# Patient Record
Sex: MALE | Race: WHITE | HISPANIC OR LATINO | Employment: OTHER | URBAN - METROPOLITAN AREA
[De-identification: names, ages, dates, MRNs, and addresses within clinical notes are randomized per-mention and may not be internally consistent; named-entity substitution may affect disease eponyms.]

---

## 2020-04-17 ENCOUNTER — HOSPITAL ENCOUNTER (EMERGENCY)
Facility: HOSPITAL | Age: 40
Discharge: HOME | End: 2020-04-17
Attending: EMERGENCY MEDICINE

## 2020-04-17 VITALS
OXYGEN SATURATION: 96 % | SYSTOLIC BLOOD PRESSURE: 111 MMHG | HEART RATE: 93 BPM | TEMPERATURE: 98 F | DIASTOLIC BLOOD PRESSURE: 68 MMHG | RESPIRATION RATE: 18 BRPM

## 2020-04-17 DIAGNOSIS — S61.012A LACERATION OF LEFT THUMB WITHOUT FOREIGN BODY WITHOUT DAMAGE TO NAIL, INITIAL ENCOUNTER: Primary | ICD-10-CM

## 2020-04-17 PROCEDURE — 63600175 PHARM REV CODE 636 W HCPCS: Performed by: PHYSICIAN ASSISTANT

## 2020-04-17 PROCEDURE — 90715 TDAP VACCINE 7 YRS/> IM: CPT | Performed by: PHYSICIAN ASSISTANT

## 2020-04-17 PROCEDURE — 12001 RPR S/N/AX/GEN/TRNK 2.5CM/<: CPT

## 2020-04-17 PROCEDURE — 99282 EMERGENCY DEPT VISIT SF MDM: CPT | Mod: 25

## 2020-04-17 PROCEDURE — 90471 IMMUNIZATION ADMIN: CPT | Performed by: PHYSICIAN ASSISTANT

## 2020-04-17 RX ORDER — LIDOCAINE HYDROCHLORIDE 10 MG/ML
10 INJECTION INFILTRATION; PERINEURAL
Status: DISCONTINUED | OUTPATIENT
Start: 2020-04-17 | End: 2020-04-17 | Stop reason: HOSPADM

## 2020-04-17 RX ADMIN — CLOSTRIDIUM TETANI TOXOID ANTIGEN (FORMALDEHYDE INACTIVATED), CORYNEBACTERIUM DIPHTHERIAE TOXOID ANTIGEN (FORMALDEHYDE INACTIVATED), BORDETELLA PERTUSSIS TOXOID ANTIGEN (GLUTARALDEHYDE INACTIVATED), BORDETELLA PERTUSSIS FILAMENTOUS HEMAGGLUTININ ANTIGEN (FORMALDEHYDE INACTIVATED), BORDETELLA PERTUSSIS PERTACTIN ANTIGEN, AND BORDETELLA PERTUSSIS FIMBRIAE 2/3 ANTIGEN 0.5 ML: 5; 2; 2.5; 5; 3; 5 INJECTION, SUSPENSION INTRAMUSCULAR at 02:04

## 2020-04-17 NOTE — ED PROVIDER NOTES
Encounter Date: 4/17/2020    SCRIBE #1 NOTE: I, Allison Sotelo , am scribing for, and in the presence of,  Allison Huntley PA-C. I have scribed the entire note.       History     Chief Complaint   Patient presents with    Laceration     pt states that he cut himself with a tape measure, lac sustained to left thumb with moderate bleeding noted; unsure of last tetanus     Yas Meza is a 40 y.o. male who  has no past medical history on file.    The patient presents to the ED due to a finger laceration. Pt reports he cut left thumb while using a tape measure at home. He denies any numbness, tingling or any other injuries at this time. Pt's Tetanus is not up to date.     The history is provided by the patient.     Review of patient's allergies indicates:  No Known Allergies  No past medical history on file.  No past surgical history on file.  No family history on file.  Social History     Tobacco Use    Smoking status: Not on file   Substance Use Topics    Alcohol use: Not on file    Drug use: Not on file     Review of Systems   Constitutional: Negative for fever.   Skin: Positive for wound.   Neurological: Negative for syncope and numbness.       Physical Exam     Initial Vitals [04/17/20 1431]   BP Pulse Resp Temp SpO2   111/68 93 18 98.1 °F (36.7 °C) 96 %      MAP       --         Physical Exam    Nursing note and vitals reviewed.  Constitutional: He appears well-developed and well-nourished.   HENT:   Head: Normocephalic and atraumatic.   Eyes: Conjunctivae, EOM and lids are normal. Pupils are equal, round, and reactive to light.   Neck: Trachea normal, normal range of motion and full passive range of motion without pain.   Abdominal: Normal appearance.   Musculoskeletal: Normal range of motion.   Neurological: He is alert.   Skin: Skin is warm, dry and intact.   Laceration just distal to the nail bed on the medial aspect of the left thumb   Does not involve nail bed  Neurovasculary intact    Psychiatric: He  has a normal mood and affect. His speech is normal and behavior is normal. Judgment and thought content normal.         ED Course   Lac Repair  Date/Time: 4/17/2020 2:55 PM  Performed by: Allison Huntley PA-C  Authorized by: Kenny Casanova MD   Body area: upper extremity  Location details: left thumb  Foreign bodies: no foreign bodies  Tendon involvement: none  Nerve involvement: none  Vascular damage: no  Preparation: Patient was prepped and draped in the usual sterile fashion.  Irrigation solution: saline  Irrigation method: syringe  Amount of cleaning: standard  Debridement: none  Degree of undermining: none  Skin closure: glue  Patient tolerance: Patient tolerated the procedure well with no immediate complications        Labs Reviewed - No data to display       Imaging Results    None          Medical Decision Making:   Initial Assessment:   Laceration to left thumb  Differential Diagnosis:   Laceration simple versus complex  ED Management:  Patient presents to the ER for evaluation of laceration sustained by tape measure prior to arrival.  Bleeding is controlled.  Tetanus was updated.  Glue was applied and patient was given information on wound care.  Instructed to return with any signs concerning for infection otherwise to just monitor at home.  Patient verbalized understanding and agreement with plan                                 Clinical Impression:       ICD-10-CM ICD-9-CM   1. Laceration of left thumb without foreign body without damage to nail, initial encounter S61.012A 883.0       Scribe Attestation I, Allison Huntley PA-C, personally performed the services described in this documentation. All medical record entries made by the scribe were at my direction and in my presence.  I have reviewed the chart and agree that the record reflects my personal performance and is accurate and complete.                           Allison Huntley PA-C  04/17/20 2915

## 2023-05-27 ENCOUNTER — HOSPITAL ENCOUNTER (OUTPATIENT)
Facility: HOSPITAL | Age: 43
Discharge: HOME | End: 2023-05-28
Attending: EMERGENCY MEDICINE | Admitting: STUDENT IN AN ORGANIZED HEALTH CARE EDUCATION/TRAINING PROGRAM

## 2023-05-27 DIAGNOSIS — R55 SYNCOPE: ICD-10-CM

## 2023-05-27 DIAGNOSIS — R56.9 SEIZURE-LIKE ACTIVITY: Primary | ICD-10-CM

## 2023-05-27 DIAGNOSIS — R07.9 CHEST PAIN: ICD-10-CM

## 2023-05-27 DIAGNOSIS — R56.9 SEIZURE: ICD-10-CM

## 2023-05-27 DIAGNOSIS — R79.89 LFT ELEVATION: ICD-10-CM

## 2023-05-27 DIAGNOSIS — R56.9 WITNESSED SEIZURE-LIKE ACTIVITY: ICD-10-CM

## 2023-05-27 PROBLEM — E78.5 HLD (HYPERLIPIDEMIA): Status: ACTIVE | Noted: 2023-05-27

## 2023-05-27 PROBLEM — R74.8 ELEVATED LIVER ENZYMES: Status: ACTIVE | Noted: 2023-05-27

## 2023-05-27 PROBLEM — K21.9 GERD (GASTROESOPHAGEAL REFLUX DISEASE): Status: ACTIVE | Noted: 2023-05-27

## 2023-05-27 LAB
ALBUMIN SERPL BCP-MCNC: 3.6 G/DL (ref 3.5–5.2)
ALBUMIN SERPL BCP-MCNC: 4 G/DL (ref 3.5–5.2)
ALP SERPL-CCNC: 119 U/L (ref 55–135)
ALP SERPL-CCNC: 138 U/L (ref 55–135)
ALT SERPL W/O P-5'-P-CCNC: 111 U/L (ref 10–44)
ALT SERPL W/O P-5'-P-CCNC: 124 U/L (ref 10–44)
AMPHET+METHAMPHET UR QL: NEGATIVE
ANION GAP SERPL CALC-SCNC: 9 MMOL/L (ref 8–16)
ANION GAP SERPL CALC-SCNC: 9 MMOL/L (ref 8–16)
ASCENDING AORTA: 2.72 CM
AST SERPL-CCNC: 39 U/L (ref 10–40)
AST SERPL-CCNC: 44 U/L (ref 10–40)
AV INDEX (PROSTH): 0.65
AV MEAN GRADIENT: 3 MMHG
AV PEAK GRADIENT: 6 MMHG
AV VALVE AREA: 2.26 CM2
AV VELOCITY RATIO: 0.63
BARBITURATES UR QL SCN>200 NG/ML: NEGATIVE
BASOPHILS # BLD AUTO: 0.04 K/UL (ref 0–0.2)
BASOPHILS NFR BLD: 0.4 % (ref 0–1.9)
BENZODIAZ UR QL SCN>200 NG/ML: NEGATIVE
BILIRUB SERPL-MCNC: 0.5 MG/DL (ref 0.1–1)
BILIRUB SERPL-MCNC: 0.5 MG/DL (ref 0.1–1)
BILIRUB UR QL STRIP: NEGATIVE
BUN SERPL-MCNC: 10 MG/DL (ref 6–20)
BUN SERPL-MCNC: 11 MG/DL (ref 6–20)
BZE UR QL SCN: NEGATIVE
CALCIUM SERPL-MCNC: 8.6 MG/DL (ref 8.7–10.5)
CALCIUM SERPL-MCNC: 9.6 MG/DL (ref 8.7–10.5)
CANNABINOIDS UR QL SCN: NEGATIVE
CHLORIDE SERPL-SCNC: 106 MMOL/L (ref 95–110)
CHLORIDE SERPL-SCNC: 108 MMOL/L (ref 95–110)
CK SERPL-CCNC: 143 U/L (ref 20–200)
CLARITY UR REFRACT.AUTO: CLEAR
CO2 SERPL-SCNC: 23 MMOL/L (ref 23–29)
CO2 SERPL-SCNC: 25 MMOL/L (ref 23–29)
COLOR UR AUTO: NORMAL
CREAT SERPL-MCNC: 0.8 MG/DL (ref 0.5–1.4)
CREAT SERPL-MCNC: 0.9 MG/DL (ref 0.5–1.4)
CREAT UR-MCNC: 47 MG/DL (ref 23–375)
CV ECHO LV RWT: 0.25 CM
DIFFERENTIAL METHOD: ABNORMAL
DOP CALC AO PEAK VEL: 1.25 M/S
DOP CALC AO VTI: 25.4 CM
DOP CALC LVOT AREA: 3.5 CM2
DOP CALC LVOT DIAMETER: 2.11 CM
DOP CALC LVOT PEAK VEL: 0.79 M/S
DOP CALC LVOT STROKE VOLUME: 57.35 CM3
DOP CALCLVOT PEAK VEL VTI: 16.41 CM
E WAVE DECELERATION TIME: 156.95 MSEC
E/A RATIO: 1.33
E/E' RATIO: 6.07 M/S
ECHO LV POSTERIOR WALL: 0.6 CM (ref 0.6–1.1)
EJECTION FRACTION: 58 %
EOSINOPHIL # BLD AUTO: 0.1 K/UL (ref 0–0.5)
EOSINOPHIL NFR BLD: 1.2 % (ref 0–8)
ERYTHROCYTE [DISTWIDTH] IN BLOOD BY AUTOMATED COUNT: 12.8 % (ref 11.5–14.5)
EST. GFR  (NO RACE VARIABLE): >60 ML/MIN/1.73 M^2
EST. GFR  (NO RACE VARIABLE): >60 ML/MIN/1.73 M^2
FRACTIONAL SHORTENING: 40 % (ref 28–44)
GLUCOSE SERPL-MCNC: 102 MG/DL (ref 70–110)
GLUCOSE SERPL-MCNC: 113 MG/DL (ref 70–110)
GLUCOSE SERPL-MCNC: 113 MG/DL (ref 70–110)
GLUCOSE SERPL-MCNC: 90 MG/DL (ref 70–110)
GLUCOSE UR QL STRIP: NEGATIVE
HCT VFR BLD AUTO: 44.9 % (ref 40–54)
HCV AB SERPL QL IA: NORMAL
HGB BLD-MCNC: 14.2 G/DL (ref 14–18)
HGB UR QL STRIP: NEGATIVE
HIV 1+2 AB+HIV1 P24 AG SERPL QL IA: NORMAL
IMM GRANULOCYTES # BLD AUTO: 0.03 K/UL (ref 0–0.04)
IMM GRANULOCYTES NFR BLD AUTO: 0.3 % (ref 0–0.5)
INTERVENTRICULAR SEPTUM: 0.76 CM (ref 0.6–1.1)
IVRT: 128.45 MSEC
KETONES UR QL STRIP: NEGATIVE
LA MAJOR: 5.5 CM
LA MINOR: 4.86 CM
LA WIDTH: 3.52 CM
LEFT ATRIUM SIZE: 3.14 CM
LEFT ATRIUM VOLUME MOD: 47.09 CM3
LEFT ATRIUM VOLUME: 48.48 CM3
LEFT INTERNAL DIMENSION IN SYSTOLE: 2.82 CM (ref 2.1–4)
LEFT VENTRICLE DIASTOLIC VOLUME: 102.89 ML
LEFT VENTRICLE SYSTOLIC VOLUME: 30.04 ML
LEFT VENTRICULAR INTERNAL DIMENSION IN DIASTOLE: 4.71 CM (ref 3.5–6)
LEFT VENTRICULAR MASS: 99.74 G
LEUKOCYTE ESTERASE UR QL STRIP: NEGATIVE
LV LATERAL E/E' RATIO: 5.67 M/S
LV SEPTAL E/E' RATIO: 6.54 M/S
LYMPHOCYTES # BLD AUTO: 3.7 K/UL (ref 1–4.8)
LYMPHOCYTES NFR BLD: 40.9 % (ref 18–48)
MAGNESIUM SERPL-MCNC: 2.2 MG/DL (ref 1.6–2.6)
MCH RBC QN AUTO: 27.9 PG (ref 27–31)
MCHC RBC AUTO-ENTMCNC: 31.6 G/DL (ref 32–36)
MCV RBC AUTO: 88 FL (ref 82–98)
METHADONE UR QL SCN>300 NG/ML: NEGATIVE
MONOCYTES # BLD AUTO: 0.6 K/UL (ref 0.3–1)
MONOCYTES NFR BLD: 6.5 % (ref 4–15)
MV PEAK A VEL: 0.64 M/S
MV PEAK E VEL: 0.85 M/S
MV STENOSIS PRESSURE HALF TIME: 45.52 MS
MV VALVE AREA P 1/2 METHOD: 4.83 CM2
NEUTROPHILS # BLD AUTO: 4.6 K/UL (ref 1.8–7.7)
NEUTROPHILS NFR BLD: 50.7 % (ref 38–73)
NITRITE UR QL STRIP: NEGATIVE
NRBC BLD-RTO: 0 /100 WBC
OPIATES UR QL SCN: NEGATIVE
PCP UR QL SCN>25 NG/ML: NEGATIVE
PH UR STRIP: 6 [PH] (ref 5–8)
PHOSPHATE SERPL-MCNC: 4.2 MG/DL (ref 2.7–4.5)
PLATELET # BLD AUTO: 315 K/UL (ref 150–450)
PMV BLD AUTO: 11 FL (ref 9.2–12.9)
POCT GLUCOSE: 113 MG/DL (ref 70–110)
POCT GLUCOSE: 121 MG/DL (ref 70–110)
POCT GLUCOSE: 124 MG/DL (ref 70–110)
POCT GLUCOSE: 167 MG/DL (ref 70–110)
POCT GLUCOSE: 90 MG/DL (ref 70–110)
POCT GLUCOSE: 96 MG/DL (ref 70–110)
POTASSIUM SERPL-SCNC: 3.8 MMOL/L (ref 3.5–5.1)
POTASSIUM SERPL-SCNC: 4 MMOL/L (ref 3.5–5.1)
PROT SERPL-MCNC: 6.4 G/DL (ref 6–8.4)
PROT SERPL-MCNC: 7.3 G/DL (ref 6–8.4)
PROT UR QL STRIP: NEGATIVE
RA MAJOR: 4.46 CM
RA PRESSURE: 3 MMHG
RA WIDTH: 3.05 CM
RBC # BLD AUTO: 5.09 M/UL (ref 4.6–6.2)
RIGHT VENTRICULAR END-DIASTOLIC DIMENSION: 3.03 CM
SINUS: 2.83 CM
SODIUM SERPL-SCNC: 140 MMOL/L (ref 136–145)
SODIUM SERPL-SCNC: 140 MMOL/L (ref 136–145)
SP GR UR STRIP: 1.01 (ref 1–1.03)
STJ: 2.5 CM
TDI LATERAL: 0.15 M/S
TDI SEPTAL: 0.13 M/S
TDI: 0.14 M/S
TOXICOLOGY INFORMATION: NORMAL
TRICUSPID ANNULAR PLANE SYSTOLIC EXCURSION: 2.53 CM
TSH SERPL DL<=0.005 MIU/L-ACNC: 3.5 UIU/ML (ref 0.4–4)
URN SPEC COLLECT METH UR: NORMAL
WBC # BLD AUTO: 8.98 K/UL (ref 3.9–12.7)

## 2023-05-27 PROCEDURE — 85025 COMPLETE CBC W/AUTO DIFF WBC: CPT | Performed by: STUDENT IN AN ORGANIZED HEALTH CARE EDUCATION/TRAINING PROGRAM

## 2023-05-27 PROCEDURE — 86803 HEPATITIS C AB TEST: CPT | Performed by: PHYSICIAN ASSISTANT

## 2023-05-27 PROCEDURE — 99285 PR EMERGENCY DEPT VISIT,LEVEL V: ICD-10-PCS | Mod: GC,,, | Performed by: EMERGENCY MEDICINE

## 2023-05-27 PROCEDURE — 25000003 PHARM REV CODE 250: Performed by: EMERGENCY MEDICINE

## 2023-05-27 PROCEDURE — 99285 EMERGENCY DEPT VISIT HI MDM: CPT | Mod: GC,,, | Performed by: EMERGENCY MEDICINE

## 2023-05-27 PROCEDURE — 96375 TX/PRO/DX INJ NEW DRUG ADDON: CPT

## 2023-05-27 PROCEDURE — 80053 COMPREHEN METABOLIC PANEL: CPT | Mod: 91 | Performed by: STUDENT IN AN ORGANIZED HEALTH CARE EDUCATION/TRAINING PROGRAM

## 2023-05-27 PROCEDURE — 96361 HYDRATE IV INFUSION ADD-ON: CPT

## 2023-05-27 PROCEDURE — 25000003 PHARM REV CODE 250

## 2023-05-27 PROCEDURE — 84443 ASSAY THYROID STIM HORMONE: CPT | Performed by: STUDENT IN AN ORGANIZED HEALTH CARE EDUCATION/TRAINING PROGRAM

## 2023-05-27 PROCEDURE — 93005 ELECTROCARDIOGRAM TRACING: CPT

## 2023-05-27 PROCEDURE — 80307 DRUG TEST PRSMV CHEM ANLYZR: CPT | Performed by: STUDENT IN AN ORGANIZED HEALTH CARE EDUCATION/TRAINING PROGRAM

## 2023-05-27 PROCEDURE — 80053 COMPREHEN METABOLIC PANEL: CPT

## 2023-05-27 PROCEDURE — G0378 HOSPITAL OBSERVATION PER HR: HCPCS

## 2023-05-27 PROCEDURE — 63600175 PHARM REV CODE 636 W HCPCS: Performed by: STUDENT IN AN ORGANIZED HEALTH CARE EDUCATION/TRAINING PROGRAM

## 2023-05-27 PROCEDURE — 87389 HIV-1 AG W/HIV-1&-2 AB AG IA: CPT | Performed by: PHYSICIAN ASSISTANT

## 2023-05-27 PROCEDURE — 93010 ELECTROCARDIOGRAM REPORT: CPT | Mod: ,,, | Performed by: INTERNAL MEDICINE

## 2023-05-27 PROCEDURE — 81003 URINALYSIS AUTO W/O SCOPE: CPT | Mod: 59 | Performed by: STUDENT IN AN ORGANIZED HEALTH CARE EDUCATION/TRAINING PROGRAM

## 2023-05-27 PROCEDURE — 95700 EEG CONT REC W/VID EEG TECH: CPT

## 2023-05-27 PROCEDURE — 99223 1ST HOSP IP/OBS HIGH 75: CPT | Mod: ,,,

## 2023-05-27 PROCEDURE — 83735 ASSAY OF MAGNESIUM: CPT | Performed by: STUDENT IN AN ORGANIZED HEALTH CARE EDUCATION/TRAINING PROGRAM

## 2023-05-27 PROCEDURE — 95720 PR EEG, W/VIDEO, CONT RECORD, I&R, >12<26 HRS: ICD-10-PCS | Mod: ,,, | Performed by: PSYCHIATRY & NEUROLOGY

## 2023-05-27 PROCEDURE — 96374 THER/PROPH/DIAG INJ IV PUSH: CPT

## 2023-05-27 PROCEDURE — 82550 ASSAY OF CK (CPK): CPT

## 2023-05-27 PROCEDURE — 99223 1ST HOSP IP/OBS HIGH 75: CPT | Mod: ,,, | Performed by: PSYCHIATRY & NEUROLOGY

## 2023-05-27 PROCEDURE — 95714 VEEG EA 12-26 HR UNMNTR: CPT

## 2023-05-27 PROCEDURE — 93010 EKG 12-LEAD: ICD-10-PCS | Mod: ,,, | Performed by: INTERNAL MEDICINE

## 2023-05-27 PROCEDURE — 99285 EMERGENCY DEPT VISIT HI MDM: CPT | Mod: 25

## 2023-05-27 PROCEDURE — 99223 PR INITIAL HOSPITAL CARE,LEVL III: ICD-10-PCS | Mod: ,,,

## 2023-05-27 PROCEDURE — 82962 GLUCOSE BLOOD TEST: CPT

## 2023-05-27 PROCEDURE — 95720 EEG PHY/QHP EA INCR W/VEEG: CPT | Mod: ,,, | Performed by: PSYCHIATRY & NEUROLOGY

## 2023-05-27 PROCEDURE — 84100 ASSAY OF PHOSPHORUS: CPT | Performed by: STUDENT IN AN ORGANIZED HEALTH CARE EDUCATION/TRAINING PROGRAM

## 2023-05-27 PROCEDURE — 99223 PR INITIAL HOSPITAL CARE,LEVL III: ICD-10-PCS | Mod: ,,, | Performed by: PSYCHIATRY & NEUROLOGY

## 2023-05-27 RX ORDER — ATORVASTATIN CALCIUM 20 MG/1
20 TABLET, FILM COATED ORAL DAILY
Status: DISCONTINUED | OUTPATIENT
Start: 2023-05-27 | End: 2023-05-28 | Stop reason: HOSPADM

## 2023-05-27 RX ORDER — ONDANSETRON 8 MG/1
8 TABLET, ORALLY DISINTEGRATING ORAL EVERY 8 HOURS PRN
Status: DISCONTINUED | OUTPATIENT
Start: 2023-05-27 | End: 2023-05-28 | Stop reason: HOSPADM

## 2023-05-27 RX ORDER — ACETAMINOPHEN 325 MG/1
650 TABLET ORAL EVERY 4 HOURS PRN
Status: DISCONTINUED | OUTPATIENT
Start: 2023-05-27 | End: 2023-05-28 | Stop reason: HOSPADM

## 2023-05-27 RX ORDER — NALOXONE HCL 0.4 MG/ML
0.02 VIAL (ML) INJECTION
Status: DISCONTINUED | OUTPATIENT
Start: 2023-05-27 | End: 2023-05-28 | Stop reason: HOSPADM

## 2023-05-27 RX ORDER — ACETAMINOPHEN 500 MG
1000 TABLET ORAL EVERY 8 HOURS PRN
Status: DISCONTINUED | OUTPATIENT
Start: 2023-05-27 | End: 2023-05-28 | Stop reason: HOSPADM

## 2023-05-27 RX ORDER — HYDROCORTISONE 25 MG/G
1 CREAM TOPICAL DAILY
Status: DISCONTINUED | OUTPATIENT
Start: 2023-05-27 | End: 2023-05-28 | Stop reason: HOSPADM

## 2023-05-27 RX ORDER — DEXTROSE 40 %
15 GEL (GRAM) ORAL
Status: DISCONTINUED | OUTPATIENT
Start: 2023-05-27 | End: 2023-05-28 | Stop reason: HOSPADM

## 2023-05-27 RX ORDER — SODIUM CHLORIDE 0.9 % (FLUSH) 0.9 %
5 SYRINGE (ML) INJECTION
Status: DISCONTINUED | OUTPATIENT
Start: 2023-05-27 | End: 2023-05-28 | Stop reason: HOSPADM

## 2023-05-27 RX ORDER — PANTOPRAZOLE SODIUM 40 MG/1
40 TABLET, DELAYED RELEASE ORAL DAILY
Status: DISCONTINUED | OUTPATIENT
Start: 2023-05-27 | End: 2023-05-28 | Stop reason: HOSPADM

## 2023-05-27 RX ORDER — DEXTROSE 40 %
30 GEL (GRAM) ORAL
Status: DISCONTINUED | OUTPATIENT
Start: 2023-05-27 | End: 2023-05-28 | Stop reason: HOSPADM

## 2023-05-27 RX ORDER — ATORVASTATIN CALCIUM 20 MG/1
TABLET, FILM COATED ORAL
COMMUNITY
Start: 2023-02-15

## 2023-05-27 RX ORDER — SIMETHICONE 80 MG
1 TABLET,CHEWABLE ORAL 4 TIMES DAILY PRN
Status: DISCONTINUED | OUTPATIENT
Start: 2023-05-27 | End: 2023-05-28 | Stop reason: HOSPADM

## 2023-05-27 RX ORDER — TALC
6 POWDER (GRAM) TOPICAL NIGHTLY PRN
Status: DISCONTINUED | OUTPATIENT
Start: 2023-05-27 | End: 2023-05-28 | Stop reason: HOSPADM

## 2023-05-27 RX ORDER — HYDROCORTISONE 25 MG/G
1 CREAM TOPICAL
COMMUNITY
Start: 2023-04-18 | End: 2023-07-17

## 2023-05-27 RX ORDER — PROCHLORPERAZINE EDISYLATE 5 MG/ML
5 INJECTION INTRAMUSCULAR; INTRAVENOUS EVERY 6 HOURS PRN
Status: DISCONTINUED | OUTPATIENT
Start: 2023-05-27 | End: 2023-05-28 | Stop reason: HOSPADM

## 2023-05-27 RX ORDER — OMEPRAZOLE 40 MG/1
40 CAPSULE, DELAYED RELEASE ORAL DAILY
COMMUNITY

## 2023-05-27 RX ORDER — MAG HYDROX/ALUMINUM HYD/SIMETH 200-200-20
30 SUSPENSION, ORAL (FINAL DOSE FORM) ORAL 4 TIMES DAILY PRN
Status: DISCONTINUED | OUTPATIENT
Start: 2023-05-27 | End: 2023-05-28 | Stop reason: HOSPADM

## 2023-05-27 RX ORDER — GLUCAGON 1 MG
1 KIT INJECTION
Status: DISCONTINUED | OUTPATIENT
Start: 2023-05-27 | End: 2023-05-28 | Stop reason: HOSPADM

## 2023-05-27 RX ORDER — IBUPROFEN 400 MG/1
400 TABLET ORAL EVERY 6 HOURS PRN
Status: DISCONTINUED | OUTPATIENT
Start: 2023-05-27 | End: 2023-05-28 | Stop reason: HOSPADM

## 2023-05-27 RX ORDER — LORAZEPAM 2 MG/ML
2 INJECTION INTRAMUSCULAR EVERY 5 MIN PRN
Status: DISCONTINUED | OUTPATIENT
Start: 2023-05-27 | End: 2023-05-28 | Stop reason: HOSPADM

## 2023-05-27 RX ORDER — BISACODYL 10 MG
10 SUPPOSITORY, RECTAL RECTAL DAILY PRN
Status: DISCONTINUED | OUTPATIENT
Start: 2023-05-27 | End: 2023-05-28 | Stop reason: HOSPADM

## 2023-05-27 RX ORDER — POLYETHYLENE GLYCOL 3350 17 G/17G
17 POWDER, FOR SOLUTION ORAL 2 TIMES DAILY PRN
Status: DISCONTINUED | OUTPATIENT
Start: 2023-05-27 | End: 2023-05-28 | Stop reason: HOSPADM

## 2023-05-27 RX ORDER — ONDANSETRON 2 MG/ML
4 INJECTION INTRAMUSCULAR; INTRAVENOUS
Status: COMPLETED | OUTPATIENT
Start: 2023-05-27 | End: 2023-05-27

## 2023-05-27 RX ORDER — LEVETIRACETAM 500 MG/5ML
2000 INJECTION, SOLUTION, CONCENTRATE INTRAVENOUS
Status: COMPLETED | OUTPATIENT
Start: 2023-05-27 | End: 2023-05-27

## 2023-05-27 RX ORDER — LIDOCAINE HYDROCHLORIDE 10 MG/ML
10 INJECTION, SOLUTION EPIDURAL; INFILTRATION; INTRACAUDAL; PERINEURAL
Status: DISCONTINUED | OUTPATIENT
Start: 2023-05-27 | End: 2023-05-27

## 2023-05-27 RX ADMIN — ONDANSETRON 4 MG: 2 INJECTION INTRAMUSCULAR; INTRAVENOUS at 03:05

## 2023-05-27 RX ADMIN — PANTOPRAZOLE SODIUM 40 MG: 40 TABLET, DELAYED RELEASE ORAL at 08:05

## 2023-05-27 RX ADMIN — SODIUM CHLORIDE 1000 ML: 9 INJECTION, SOLUTION INTRAVENOUS at 03:05

## 2023-05-27 RX ADMIN — HYDROCORTISONE 1 APPLICATION: 25 CREAM TOPICAL at 06:05

## 2023-05-27 RX ADMIN — ATORVASTATIN CALCIUM 20 MG: 20 TABLET, FILM COATED ORAL at 08:05

## 2023-05-27 RX ADMIN — SODIUM CHLORIDE 1000 ML: 9 INJECTION, SOLUTION INTRAVENOUS at 06:05

## 2023-05-27 RX ADMIN — LEVETIRACETAM 2000 MG: 100 INJECTION, SOLUTION INTRAVENOUS at 02:05

## 2023-05-27 NOTE — SUBJECTIVE & OBJECTIVE
History reviewed. No pertinent past medical history.    History reviewed. No pertinent surgical history.    Review of patient's allergies indicates:  No Known Allergies    No current facility-administered medications on file prior to encounter.     Current Outpatient Medications on File Prior to Encounter   Medication Sig    atorvastatin (LIPITOR) 20 MG tablet 1 tablet Orally Once a day for 90 days    hydrocortisone 2.5 % cream 1 application.     Family History    None       Tobacco Use    Smoking status: Never    Smokeless tobacco: Never   Substance and Sexual Activity    Alcohol use: Not Currently    Drug use: Never    Sexual activity: Not on file     Review of Systems   Constitutional:  Negative for chills and fever.   HENT:  Negative for trouble swallowing.    Eyes:  Negative for photophobia and visual disturbance.   Respiratory:  Negative for shortness of breath and wheezing.    Cardiovascular:  Positive for palpitations. Negative for chest pain.   Gastrointestinal:  Negative for abdominal pain, diarrhea, nausea and vomiting.   Genitourinary:  Negative for dysuria, frequency, hematuria and urgency.   Musculoskeletal:  Negative for arthralgias and myalgias.   Skin:  Negative for color change and rash.   Neurological:  Positive for seizures, syncope, numbness and headaches. Negative for weakness and light-headedness.   Psychiatric/Behavioral:  Positive for confusion. The patient is not nervous/anxious.    Objective:     Vital Signs (Most Recent):  Temp: 98.3 °F (36.8 °C) (05/27/23 0159)  Pulse: 66 (05/27/23 0423)  Resp: 19 (05/27/23 0423)  BP: 133/81 (05/27/23 0418)  SpO2: 100 % (05/27/23 0423) Vital Signs (24h Range):  Temp:  [98.3 °F (36.8 °C)] 98.3 °F (36.8 °C)  Pulse:  [66-97] 66  Resp:  [18-21] 19  SpO2:  [97 %-100 %] 100 %  BP: (120-133)/(74-81) 133/81        There is no height or weight on file to calculate BMI.     Physical Exam  Vitals and nursing note reviewed.   Constitutional:       General: He is not  in acute distress.     Appearance: He is well-developed.   HENT:      Head: Normocephalic and atraumatic.      Mouth/Throat:      Pharynx: No oropharyngeal exudate.   Eyes:      Extraocular Movements: Extraocular movements intact.      Conjunctiva/sclera: Conjunctivae normal.      Pupils: Pupils are equal, round, and reactive to light.   Cardiovascular:      Rate and Rhythm: Normal rate and regular rhythm.      Heart sounds: Normal heart sounds.   Pulmonary:      Effort: Pulmonary effort is normal. No respiratory distress.      Breath sounds: Normal breath sounds.   Abdominal:      General: Bowel sounds are normal. There is no distension.      Palpations: Abdomen is soft.      Tenderness: There is no abdominal tenderness.   Musculoskeletal:         General: No tenderness. Normal range of motion.      Cervical back: Normal range of motion and neck supple.   Skin:     General: Skin is warm and dry.      Capillary Refill: Capillary refill takes less than 2 seconds.   Neurological:      General: No focal deficit present.      Mental Status: He is alert and oriented to person, place, and time.      Cranial Nerves: No cranial nerve deficit.      Motor: No weakness.   Psychiatric:         Behavior: Behavior normal.         Thought Content: Thought content normal.            CRANIAL NERVES     CN III, IV, VI   Pupils are equal, round, and reactive to light.     Significant Labs: All pertinent labs within the past 24 hours have been reviewed.  CBC:   Recent Labs   Lab 05/27/23 0228   WBC 8.98   HGB 14.2   HCT 44.9        CMP:   Recent Labs   Lab 05/27/23 0228      K 3.8      CO2 25   *   BUN 11   CREATININE 0.9   CALCIUM 9.6   PROT 7.3   ALBUMIN 4.0   BILITOT 0.5   ALKPHOS 138*   AST 44*   *   ANIONGAP 9     Magnesium:   Recent Labs   Lab 05/27/23 0228   MG 2.2     TSH:   Recent Labs   Lab 05/27/23 0228   TSH 3.500         Significant Imaging: I have reviewed all pertinent imaging  "results/findings within the past 24 hours.  CT Head Without Contrast  Narrative: EXAMINATION:  CT HEAD WITHOUT CONTRAST    CLINICAL HISTORY:  Seizure, new-onset, no history of trauma;    TECHNIQUE:  Low dose axial images were obtained through the head.  Coronal and sagittal reformations were also performed. Contrast was not administered.    COMPARISON:  None.    FINDINGS:  No evidence of acute territorial infarct, hemorrhage, mass effect, or midline shift.    Ventricles are normal in size and configuration.    No displaced calvarial fracture.    Minimal frothy opacities in the left ethmoid air cells.  Otherwise, the visualized paranasal sinuses and mastoid air cells are essentially clear.  Impression: No CT evidence of acute intracranial abnormality.    Electronically signed by: Toni Zurita MD  Date:    05/27/2023  Time:    03:50  X-Ray Chest AP Portable  Narrative: EXAMINATION:  XR CHEST AP PORTABLE    CLINICAL HISTORY:  Provided history is "  Unspecified convulsions".    TECHNIQUE:  One view of the chest.    COMPARISON:  None.    FINDINGS:  Cardiac wires overlie the chest.  Cardiac silhouette is not enlarged.  No focal consolidation.  No sizable pleural effusion.  No pneumothorax.  Impression: No acute cardiopulmonary finding identified on this single view.    Electronically signed by: Toni Zurita MD  Date:    05/27/2023  Time:    02:17     "

## 2023-05-27 NOTE — ASSESSMENT & PLAN NOTE
Neurology is consulted 42 yo M presents with a witnessed 20-second tonic-clonic seizure preceded by right hand numbness. He briefly lost consciousness when he witnessed blood drawn from his son in the ED setting. Patient's presentation is concerning for syncope. Given history and semiology lowers suspicion for epileptic activity. Patient also has had much difficulty with sleep for the past 1.5 months, sleeping 2-3 hours per night.     Neuro exam grossly intact, significant only for slightly brisk patellar reflexes.    Recommendations:  -- Follow up in outpatient neurology; message sent to staff to make arrangments.  -- Referral to sleep clinic for DINO.  -- Follow up with echo and further cardiac workup for syncope  -- Seizure precautions  -- Neurology will sign off, please call with questions or concerns.    Patient counseled on seizure precautions  until six months seizure free including no driving or operating heavy machinery, no submerging in water, take showers instead of baths whenever possible, do not care for children alone, caution when using hot objects especially cooking and do not scale ladders or heights unsupported or unaccompanied.

## 2023-05-27 NOTE — ASSESSMENT & PLAN NOTE
Syncope  43 M with HLD and GERD who presents after witnessed 20 second tonic-clonic seizure-like activity noted by a pediatric ED nurse today while pt was here with his son. Patient is a poor historian and reports possible syncope vs arrhythmia vs seizure activity in the past, but states he was never evaluated for seizures but was told his heart was not the cause. Patient confused after event in ED and reports loss of bladder control. Does not remember the event.    -AFVSS. CBC, CMP, mag, phos, TSH, POCT glucose unremarkable.   -UA/tox screen pending.   -CK ordered  -CXR no acute findings.   -CT head without acute intracranial abnormality.   -Given keppra 2,000 mg IV, zofran 4 mg IV, and 1L NS bolus in there ED  -Seizure precautions, fall precautions, and q4h neuro checks  -Monitor telemetry, check orthostatic vitals  -Echo ordered for syncope evlauation  -EEG ordered  -Consult neurology in the morning  -Prn tylenol and ibuprofen for headaches

## 2023-05-27 NOTE — ASSESSMENT & PLAN NOTE
-Continue home Protonix  -Reports recently completing 10 days of treatment for H pylori. No acute concerns

## 2023-05-27 NOTE — Clinical Note
Diagnosis: Seizure [[id number]]   Future Attending Provider: RED KYLE [[id number]]   Admitting Provider:: RED KYLE [[id number]]

## 2023-05-27 NOTE — ED NOTES
I assumed care of this patient at this time. Pt is resting comfortably in ED stretcher. Pt is AAOx4. RR is even, unlabored, and spontaneous; 98% on room air at this time. Skin is warm, dry and intact. Pt remains on continuous cardiac monitor, pulse ox, and cycling BP cuff. Pt denies pain or needs at this time. Bed low and locked; side rails up x2; call light within reach.    LOC: Patient is awake, alert, and aware of environment with an appropriate affect. Patient is oriented x 3 and speaking appropriately.  APPEARANCE: Patient resting comfortably and in no acute distress. Patient is clean and well groomed, patient's clothing is properly fastened.  HEENT: WDL  SKIN: The skin is warm and dry. Patient has normal skin turgor and moist mucus membranes. Skin is intact; no bruising or breakdown noted.  MUSKULOSKELETAL: Patient is moving all extremities well, no obvious deformities noted. Pulses intact.   RESPIRATORY: Airway is open and patent. Respirations are spontaneous and non-labored with normal effort and rate  CARDIAC: No peripheral edema noted.   ABDOMEN: No distention noted. Bowel sounds active in all 4 quadrants. Soft and non-tender upon palpation.  NEUROLOGICAL: Facial expression is symmetrical. Hand grasps are equal bilaterally. Normal sensation in all extremities when touched with finger.

## 2023-05-27 NOTE — ASSESSMENT & PLAN NOTE
-Elevated AST, ALT, Alk phos noted on admission labs, no baseline for comparison  -Denies GI symptoms except some minimal residual abdominal pain associated with H pylori infection  -Continue to monitor daily labs  -Follow up outpatient as instructed with provider from OSH following pt's GERD/H pylori infection.

## 2023-05-27 NOTE — ED PROVIDER NOTES
Encounter Date: 5/27/2023       History     Chief Complaint   Patient presents with    Seizures     Pt visiting another patient in ED - started seizing in room      Mr. Meza is a 43-year-old male with no significant past medical history who presents to the emergency department due to a seizure.   Patient states that he had brought his son to the pediatric emergency department and while he was there he was told he had a seizure and so was brought to the adult side he states that he has had seizures 3 times in the past but has never seen Neurology and has never been on any medication because it resolved.  He states that in the past they told him it was not a seizure but it was something wrong with his heart that was causing it.  He states that he had seen a cardiologist who told him that there was a problem with his heart but he does not know much more than that.  Patient states that he was feeling fatigued today but denies any other complaints.  He does state that since the seizure he has been having weakness and numbness in his right upper extremity.       The history is provided by the patient.   Review of patient's allergies indicates:  No Known Allergies  History reviewed. No pertinent past medical history.  History reviewed. No pertinent surgical history.  History reviewed. No pertinent family history.  Social History     Tobacco Use    Smoking status: Never    Smokeless tobacco: Never   Substance Use Topics    Alcohol use: Not Currently    Drug use: Never     Review of Systems   Constitutional:  Positive for fatigue. Negative for chills, diaphoresis and fever.   HENT:  Negative for congestion, rhinorrhea and sore throat.    Eyes:  Negative for visual disturbance.   Respiratory:  Negative for cough, chest tightness and shortness of breath.    Cardiovascular:  Negative for chest pain.   Gastrointestinal:  Negative for abdominal pain, blood in stool, constipation, diarrhea and vomiting.   Genitourinary:   Negative for dysuria, hematuria and urgency.   Musculoskeletal:  Negative for back pain.   Skin:  Negative for rash.   Neurological:  Positive for seizures, weakness and numbness. Negative for syncope.   Hematological:  Does not bruise/bleed easily.   Psychiatric/Behavioral:  Negative for agitation and hallucinations.      Physical Exam     Initial Vitals [05/27/23 0159]   BP Pulse Resp Temp SpO2   120/74 97 18 98.3 °F (36.8 °C) 97 %      MAP       --         Physical Exam     Nursing note and vitals reviewed.  Constitutional:  Patient initially confused but became alert and oriented x3 while in the emergency department  HENT:   Head: Normocephalic and atraumatic.   Eyes: Conjunctivae and EOM are normal. Pupils are equal, round, and reactive to light. no scleral icterus, no periorbital edema or ecchymosis  Neck: Neck supple. no stridor, no masses, no drooling or voice changes  Normal range of motion.  Cardiovascular: Normal rate, regular rhythm, normal heart sounds and intact distal pulses. no m/r/g  Pulmonary/Chest: Breath sounds normal. CTAB, no wheezes, rales or rhonchi, no increased work of breathing  Abdominal: Abdomen is soft. Patient exhibits no distension. There is no abdominal tenderness. no organomegaly, no CVAT  Musculoskeletal:      Cervical back: Normal range of motion and neck supple.   Neurological: Patient is alert and oriented to person, place, and time. No cranial nerve deficit. Gait normal. GCS score is 15. Moving all extremities, gait intact, face grossly symmetric  Skin: Skin is warm and dry.  Ext: no edema, no lesions, rashes, or deformity  Psych: Normal mood/affect,cooperative, well groomed, makes good eye contact        ED Course   Procedures  Labs Reviewed   CBC W/ AUTO DIFFERENTIAL - Abnormal; Notable for the following components:       Result Value    MCHC 31.6 (*)     All other components within normal limits    Narrative:     Release to patient->Immediate   COMPREHENSIVE METABOLIC PANEL  - Abnormal; Notable for the following components:    Glucose 113 (*)     Alkaline Phosphatase 138 (*)     AST 44 (*)      (*)     All other components within normal limits    Narrative:     Release to patient->Immediate   POCT GLUCOSE - Abnormal; Notable for the following components:    POCT Glucose 113 (*)     All other components within normal limits   HIV 1 / 2 ANTIBODY    Narrative:     Release to patient->Immediate   HEPATITIS C ANTIBODY    Narrative:     Release to patient->Immediate   TSH    Narrative:     Release to patient->Immediate   MAGNESIUM    Narrative:     Release to patient->Immediate   PHOSPHORUS    Narrative:     Release to patient->Immediate   URINALYSIS, REFLEX TO URINE CULTURE   DRUG SCREEN PANEL, URINE EMERGENCY   COMPREHENSIVE METABOLIC PANEL   CK   URINALYSIS, REFLEX TO URINE CULTURE   TOXICOLOGY SCREEN, URINE, RANDOM (COMPLIANCE)   POCT GLUCOSE, HAND-HELD DEVICE   POCT GLUCOSE MONITORING CONTINUOUS     EKG Readings: (Independently Interpreted)   Initial Reading: No STEMI. Rhythm: Normal Sinus Rhythm. Heart Rate: 77.     Imaging Results              CT Head Without Contrast (Final result)  Result time 05/27/23 03:50:19      Final result by Toni Zurita MD (05/27/23 03:50:19)                   Impression:      No CT evidence of acute intracranial abnormality.      Electronically signed by: Toni Zurita MD  Date:    05/27/2023  Time:    03:50               Narrative:    EXAMINATION:  CT HEAD WITHOUT CONTRAST    CLINICAL HISTORY:  Seizure, new-onset, no history of trauma;    TECHNIQUE:  Low dose axial images were obtained through the head.  Coronal and sagittal reformations were also performed. Contrast was not administered.    COMPARISON:  None.    FINDINGS:  No evidence of acute territorial infarct, hemorrhage, mass effect, or midline shift.    Ventricles are normal in size and configuration.    No displaced calvarial fracture.    Minimal frothy opacities in the left ethmoid  "air cells.  Otherwise, the visualized paranasal sinuses and mastoid air cells are essentially clear.                                       X-Ray Chest AP Portable (Final result)  Result time 05/27/23 02:17:43      Final result by Toni Zurita MD (05/27/23 02:17:43)                   Impression:      No acute cardiopulmonary finding identified on this single view.      Electronically signed by: Toni Zurita MD  Date:    05/27/2023  Time:    02:17               Narrative:    EXAMINATION:  XR CHEST AP PORTABLE    CLINICAL HISTORY:  Provided history is "  Unspecified convulsions".    TECHNIQUE:  One view of the chest.    COMPARISON:  None.    FINDINGS:  Cardiac wires overlie the chest.  Cardiac silhouette is not enlarged.  No focal consolidation.  No sizable pleural effusion.  No pneumothorax.                                       Medications   sodium chloride 0.9% bolus 1,000 mL 1,000 mL (1,000 mLs Intravenous New Bag 5/27/23 0351)   sodium chloride 0.9% flush 5 mL (has no administration in time range)   melatonin tablet 6 mg (has no administration in time range)   ondansetron disintegrating tablet 8 mg (has no administration in time range)   prochlorperazine injection Soln 5 mg (has no administration in time range)   polyethylene glycol packet 17 g (has no administration in time range)   bisacodyL suppository 10 mg (has no administration in time range)   simethicone chewable tablet 80 mg (has no administration in time range)   aluminum-magnesium hydroxide-simethicone 200-200-20 mg/5 mL suspension 30 mL (has no administration in time range)   acetaminophen tablet 650 mg (has no administration in time range)   acetaminophen tablet 1,000 mg (has no administration in time range)   naloxone 0.4 mg/mL injection 0.02 mg (has no administration in time range)   glucagon (human recombinant) injection 1 mg (has no administration in time range)   dextrose 10% bolus 125 mL 125 mL (has no administration in time range) "   dextrose 10% bolus 250 mL 250 mL (has no administration in time range)   dextrose 40 % gel 15,000 mg (has no administration in time range)   dextrose 40 % gel 30,000 mg (has no administration in time range)   levETIRAcetam injection 2,000 mg (2,000 mg Intravenous Given 5/27/23 0233)   ondansetron injection 4 mg (4 mg Intravenous Given 5/27/23 6991)     Medical Decision Making:   Initial Assessment:   Mr. Meza is a 43-year-old male with no significant past medical history who presents to the emergency department due to a seizure.   Differential Diagnosis:   Seizure  Pseudoseizure  Acute intoxication  Medication withdrawal  Sepsis    Independently Interpreted Test(s):   I have ordered and independently interpreted X-rays - see prior notes.  I have ordered and independently interpreted EKG Reading(s) - see prior notes  Clinical Tests:   Lab Tests: Ordered and Reviewed  Radiological Study: Reviewed and Ordered  Medical Tests: Ordered and Reviewed  ED Management:  Patient presented to emergency department following a seizure  He was fully examined and did not have any signs of trauma.   Labs were ordered including a CBC which was non-significant, CMP was non-significant, urinalysis did not show any signs of infection.   EKG was within normal limits  CT of the head did not show any significant findings  Patient was re-evaluated and he reported symptomatic improvement.   Patient was successfully loaded with Keppra  He was initially postictal but became alert and oriented while in emergency department  Given that patient had reported a history of possible arrhythmia prior to the seizures I felt that patient needed to be admitted for seizure workup as well as a possible arrhythmia workup.  Discussion was had Hospital Medicine and he was admitted.           Attending Attestation:             Attending ED Notes:   ATTENDING PHYSICIAN ATTESTATION    I have personally seen and examined this patient and repeated the key  "portions of the resident's history and physical, reviewed and agree with the resident medical documentation, and supervised and managed the medical care of the patient.  I was present and supervising any critical portions of procedures performed      43-year-old male presents the ER for evaluation of witnessed seizure while in the ED. Was postictal with positive urination.  He reports this has happened before and this is "cardiac seizure", patient unable to explain more.  He is a poor historian.  Will plan admission for further workup and evaluation.                 Clinical Impression:   Final diagnoses:  [R56.9] Seizure  [R79.89] LFT elevation  [R56.9] Seizure-like activity (Primary)        ED Disposition Condition    Observation Stable                Deanne Obrien MD  Resident  05/27/23 0416       Yomi Carcamo MD  05/27/23 0579    "
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
## 2023-05-27 NOTE — CARE UPDATE
Care Update    Patient seen and examined at bedside.  services used. Hypotensive, other VSSAF. Patient endorsing complaints dizziness (worse with standing), difficulty swallowing water and food upon arrival but improving throughout hospital course, and anal discomfort described as itching. Patient able to ambulate around room and no focal deficits on exam. Per chart review patient with history of grade 1 hemorrhoids and will restart home hydrocortisone cream. Consider MRI brain given history of seizure yesterday and dizziness. CTH unremarkable. Patient also endorsing LLE shin pain to palpation, denies pain while walking, consider bilateral lower extremity US. Echo with EF 55-60%, CVP3. 1L NS bolus ordered. EEG ordered. Neurology consulted. CBC without leukocytosis. CMP without emergent abnormalities.     Physical Exam  Cardiovascular:      Rate and Rhythm: Normal rate and regular rhythm.      Pulses: Normal pulses.      Heart sounds: Normal heart sounds.   Pulmonary:      Effort: Pulmonary effort is normal. No respiratory distress.      Breath sounds: Normal breath sounds. No wheezing.   Abdominal:      General: There is no distension.      Tenderness: There is no abdominal tenderness.   Musculoskeletal:         General: Tenderness (LLE) present. No swelling.      Right lower leg: No edema.      Left lower leg: No edema.   Skin:     Capillary Refill: Capillary refill takes less than 2 seconds.   Neurological:      General: No focal deficit present.      Mental Status: He is oriented to person, place, and time.      Cranial Nerves: No cranial nerve deficit.      Kd Gonzalez PA-C  Hospital Medicine [facility]
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
## 2023-05-27 NOTE — ED TRIAGE NOTES
Pt was in PEDS ER with child and had a seizure per nursing staff. Pt seizure was short in duration approx 30 -45 secs. Pt urinated on himself slightly. Pt arrives to ED AOX4. Pt Greenlandic speaking only but states he has Hx of seizure 4 or 5 times over the last several years but is not taking any medication and has never been diagnosed before.

## 2023-05-27 NOTE — PROCEDURES
EEG prelim -> this study is not running live on the     The patient is in a room with no live  access.  This means that we are unable to access the EEG in real-time if the patient has an event.  Depending on the clinical scenario, recommend transferring the patient to a room with live  access so that the EEG can be reviewed immediately after any concerning clinical episodes.  If the patient is not transferred, the EEG will be uploaded in the morning and reviewed in its entirety at that time.    Please hit the EEG button with any clinical activity concerning for seizures and describe what you see.    Full report after the completion of the study.    Veronica Barajas MD PhD [facility]  Neurology-Epilepsy  [facility].

## 2023-05-28 VITALS
SYSTOLIC BLOOD PRESSURE: 122 MMHG | OXYGEN SATURATION: 96 % | TEMPERATURE: 97 F | DIASTOLIC BLOOD PRESSURE: 68 MMHG | HEART RATE: 68 BPM | RESPIRATION RATE: 18 BRPM

## 2023-05-28 LAB
ALBUMIN SERPL BCP-MCNC: 3.3 G/DL (ref 3.5–5.2)
ALP SERPL-CCNC: 115 U/L (ref 55–135)
ALT SERPL W/O P-5'-P-CCNC: 96 U/L (ref 10–44)
ANION GAP SERPL CALC-SCNC: 10 MMOL/L (ref 8–16)
AST SERPL-CCNC: 32 U/L (ref 10–40)
BASOPHILS # BLD AUTO: 0.03 K/UL (ref 0–0.2)
BASOPHILS NFR BLD: 0.3 % (ref 0–1.9)
BILIRUB SERPL-MCNC: 0.4 MG/DL (ref 0.1–1)
BUN SERPL-MCNC: 9 MG/DL (ref 6–20)
CALCIUM SERPL-MCNC: 8.6 MG/DL (ref 8.7–10.5)
CHLORIDE SERPL-SCNC: 108 MMOL/L (ref 95–110)
CO2 SERPL-SCNC: 21 MMOL/L (ref 23–29)
CREAT SERPL-MCNC: 0.8 MG/DL (ref 0.5–1.4)
DIFFERENTIAL METHOD: ABNORMAL
EOSINOPHIL # BLD AUTO: 0.1 K/UL (ref 0–0.5)
EOSINOPHIL NFR BLD: 1.2 % (ref 0–8)
ERYTHROCYTE [DISTWIDTH] IN BLOOD BY AUTOMATED COUNT: 13 % (ref 11.5–14.5)
EST. GFR  (NO RACE VARIABLE): >60 ML/MIN/1.73 M^2
GLUCOSE SERPL-MCNC: 99 MG/DL (ref 70–110)
HCT VFR BLD AUTO: 43 % (ref 40–54)
HGB BLD-MCNC: 13.5 G/DL (ref 14–18)
IMM GRANULOCYTES # BLD AUTO: 0.03 K/UL (ref 0–0.04)
IMM GRANULOCYTES NFR BLD AUTO: 0.3 % (ref 0–0.5)
LYMPHOCYTES # BLD AUTO: 3 K/UL (ref 1–4.8)
LYMPHOCYTES NFR BLD: 33.3 % (ref 18–48)
MAGNESIUM SERPL-MCNC: 2 MG/DL (ref 1.6–2.6)
MCH RBC QN AUTO: 27.7 PG (ref 27–31)
MCHC RBC AUTO-ENTMCNC: 31.4 G/DL (ref 32–36)
MCV RBC AUTO: 88 FL (ref 82–98)
MONOCYTES # BLD AUTO: 0.7 K/UL (ref 0.3–1)
MONOCYTES NFR BLD: 7.4 % (ref 4–15)
NEUTROPHILS # BLD AUTO: 5.1 K/UL (ref 1.8–7.7)
NEUTROPHILS NFR BLD: 57.5 % (ref 38–73)
NRBC BLD-RTO: 0 /100 WBC
PHOSPHATE SERPL-MCNC: 4 MG/DL (ref 2.7–4.5)
PLATELET # BLD AUTO: 294 K/UL (ref 150–450)
PMV BLD AUTO: 11.2 FL (ref 9.2–12.9)
POCT GLUCOSE: 83 MG/DL (ref 70–110)
POCT GLUCOSE: 84 MG/DL (ref 70–110)
POCT GLUCOSE: 89 MG/DL (ref 70–110)
POTASSIUM SERPL-SCNC: 3.9 MMOL/L (ref 3.5–5.1)
PROT SERPL-MCNC: 6 G/DL (ref 6–8.4)
RBC # BLD AUTO: 4.87 M/UL (ref 4.6–6.2)
SODIUM SERPL-SCNC: 139 MMOL/L (ref 136–145)
WBC # BLD AUTO: 8.95 K/UL (ref 3.9–12.7)

## 2023-05-28 PROCEDURE — 36415 COLL VENOUS BLD VENIPUNCTURE: CPT

## 2023-05-28 PROCEDURE — 99233 PR SUBSEQUENT HOSPITAL CARE,LEVL III: ICD-10-PCS | Mod: ,,, | Performed by: PSYCHIATRY & NEUROLOGY

## 2023-05-28 PROCEDURE — 94761 N-INVAS EAR/PLS OXIMETRY MLT: CPT

## 2023-05-28 PROCEDURE — 84100 ASSAY OF PHOSPHORUS: CPT

## 2023-05-28 PROCEDURE — 99233 SBSQ HOSP IP/OBS HIGH 50: CPT | Mod: ,,, | Performed by: PSYCHIATRY & NEUROLOGY

## 2023-05-28 PROCEDURE — 25000003 PHARM REV CODE 250

## 2023-05-28 PROCEDURE — 80053 COMPREHEN METABOLIC PANEL: CPT

## 2023-05-28 PROCEDURE — 99239 HOSP IP/OBS DSCHRG MGMT >30: CPT | Mod: ,,,

## 2023-05-28 PROCEDURE — 99239 PR HOSPITAL DISCHARGE DAY,>30 MIN: ICD-10-PCS | Mod: ,,,

## 2023-05-28 PROCEDURE — G0378 HOSPITAL OBSERVATION PER HR: HCPCS

## 2023-05-28 PROCEDURE — 83735 ASSAY OF MAGNESIUM: CPT

## 2023-05-28 PROCEDURE — 96361 HYDRATE IV INFUSION ADD-ON: CPT

## 2023-05-28 PROCEDURE — 85025 COMPLETE CBC W/AUTO DIFF WBC: CPT

## 2023-05-28 RX ORDER — HALOPERIDOL 5 MG/ML
5 INJECTION INTRAMUSCULAR ONCE
Status: DISCONTINUED | OUTPATIENT
Start: 2023-05-28 | End: 2023-05-28

## 2023-05-28 RX ADMIN — HYDROCORTISONE 1 APPLICATION: 25 CREAM TOPICAL at 09:05

## 2023-05-28 RX ADMIN — SODIUM CHLORIDE 1000 ML: 9 INJECTION, SOLUTION INTRAVENOUS at 08:05

## 2023-05-28 RX ADMIN — ATORVASTATIN CALCIUM 20 MG: 20 TABLET, FILM COATED ORAL at 08:05

## 2023-05-28 RX ADMIN — PANTOPRAZOLE SODIUM 40 MG: 40 TABLET, DELAYED RELEASE ORAL at 08:05

## 2023-05-28 NOTE — PLAN OF CARE
Problem: Infection  Goal: Absence of Infection Signs and Symptoms  Outcome: Met     Problem: Adult Inpatient Plan of Care  Goal: Plan of Care Review  Outcome: Met  Goal: Patient-Specific Goal (Individualized)  Outcome: Met  Goal: Absence of Hospital-Acquired Illness or Injury  5/28/2023 1709 by Rl Diaz RN  Outcome: Met  5/28/2023 1323 by Rl Diaz RN  Outcome: Ongoing, Progressing  Goal: Optimal Comfort and Wellbeing  5/28/2023 1709 by Rl Diaz RN  Outcome: Met  5/28/2023 1323 by Rl Diaz RN  Outcome: Ongoing, Progressing  Goal: Readiness for Transition of Care  Outcome: Met     Problem: Seizure, Active Management  Goal: Absence of Seizure/Seizure-Related Injury  5/28/2023 1709 by Rl Diaz RN  Outcome: Met  5/28/2023 1323 by Rl Diaz RN  Outcome: Ongoing, Progressing     Problem: Syncope  Goal: Absence of Syncopal Symptoms  Outcome: Met

## 2023-05-28 NOTE — HOSPITAL COURSE
Yas Meza is placed in  Observation for management of reported witnessed seizure like activity. Keppra loaded in the ED on arrival. CTH without evidence of acute intracranial abnormality. Neurology consulted who report low suspicion for epileptic activity and no recommendation for MRI brain. EEG unremarkable. Echo showing EF 55-60%, CVP 3. Patient without any further reported seizure like episodes during hospital course. Elevated LFTs, but down trending throughout hospital course. Patient with hypotension on vitals, improving after IVF. Patient walked and tolerating well with good strength. Patient reporting that he feels much better. Referrals to Hepatology, Internal Medicine, Sleep Disorders, and Neurology ordered. Patient medically ready for discharge. Plan of care discussed with patient, patient agreeable to plan, and all questions answered.

## 2023-05-28 NOTE — PROGRESS NOTES
5/28/23 SHELIA, EEG & ECHO complete and WNL    42 yo male previously healthy who presents for episode for AOC associated with movement. History is notable for 3 previous episodes of similar seimology  Exam is notable for normotensive, afebrile, fatigued, negative orthostatics, A+OX3, non-focal neurological exam. Workup is notable for non-acute CTH, non-acute CXR, WNL CBC, CMP; EKG WNL. ECHO & EEG WNL. Pt's presentation is concerning for Syncope. Given history and semiology lower suspicion for epileptic activity, but remains in differential. Query cardiac in nature    -follow-up in OP Neuro   -sleep clinic referral, query DINO  -Seizure precautions were fully reviewed with this patient who was instructed on: No driving, no heights, no operating heavy moveable machinery, no baths (shower instead), no swimming and no cooking on front burners of stoves. The patient should be brought to the ER for any seizure activity lasting longer than 5-10 minutes or if two seizures are occurring back to back without full return to baseline mental status. No driving precautions will last 6 months spell free reviewed today-- patient demonstrates understanding    Tele- was used for this encounter    Thank you for involving Neurology in the care of this patient.  Please contact [phone] with any further questions.      Tiny Meyers MD  Neurologist  [facility]

## 2023-05-28 NOTE — PLAN OF CARE
Problem: Adult Inpatient Plan of Care  Goal: Absence of Hospital-Acquired Illness or Injury  Outcome: Ongoing, Progressing  Goal: Optimal Comfort and Wellbeing  Outcome: Ongoing, Progressing     Problem: Seizure, Active Management  Goal: Absence of Seizure/Seizure-Related Injury  Outcome: Ongoing, Progressing
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
## 2023-05-28 NOTE — PROCEDURES
[facility] EEG/VIDEO MONITORING REPORT  Yas Meza  [id number]  1980    DATE OF SERVICE:  05/27/2023-05/28/2023  DATE OF ADMISSION: 5/27/2023  1:57 AM    ADMITTING/REQUESTING PROVIDER: Gina Hdz MD    REASON FOR CONSULT:  43-year-old man with episodes of decreased responsiveness with abnormal movements.  Evaluate for evidence of epileptiform activity.    METHODOLOGY   Electroencephalographic (EEG) recording is with electrodes placed according to the International 10-20 placement system.  Thirty two (32) channels of digital signal (sampling rate of 512/sec) including T1 and T2 was simultaneously recorded from the scalp and may include  EKG, EMG, and/or eye monitors.  Recording band pass was 0.1 to 512 hz.  Digital video recording of the patient is simultaneously recorded with the EEG.  The patient is instructed report clinical symptoms which may occur during the recording session.  EEG and video recording is stored and archived in digital format.  Activation procedures which include photic stimulation, hyperventilation and instructing patients to perform simple task are done in selected patients.   The EEG is displayed on a monitor screen and can be reviewed using different montages.  Computer assisted analysis is employed to detect spike and electrographic seizure activity.   The entire record is submitted for computer analysis.  The entire recording is visually reviewed and the times identified by computer analysis as being spikes or seizures are reviewed again.  Compresses spectral analysis (CSA) is also performed on the activity recorded from each individual channel.  This is displayed as a power display of frequencies from 0 to 30 Hz over time.   The CSA is reviewed looking for asymmetries in power between homologous areas of the scalp and then compared with the original EEG recording.     Patients Know Best software is also utilized in the review of this study.  This software suite analyzes the EEG recording in  multiple domains.  Coherence and rhythmicity is computed to identify EEG sections which may contain organized seizures.  Each channel undergoes analysis to detect presence of spike and sharp waves which have special and morphological characteristic of epileptic activity.  The routine EEG recording is converted from spacial into frequency domain.  This is then displayed comparing homologous areas to identify areas of significant asymmetry.  Algorithm to identify non-cortically generated artifact is used to separate eye movement, EMG and other artifact from the EEG.      RECORDING TIMES  Start on 05/27/2023 at 15:51 p.m.  Stop on 05/28/2023 at 08:50 a.m.  A total of 16 hours and 57 minutes of EEG recording is obtained.    EEG FINDINGS  Background activity:   The waking background is continuous and relatively symmetric with a well-formed 11 hz posterior dominant rhythm seen bilaterally.    There are no pushbutton activations.    Sleep:  The patient transitions from wakefulness to sleep with the appearance of sleep spindles, K complexes, and vertex waves.    Activation procedures:   Hyperventilation is not performed  Photic stimulation is not performed    Cardiac Monitor:   Heart rate appears generally regular on a single lead EKG.    Impression:   This is a normal continuous EEG monitoring study.  There are no pushbutton activations, no epileptiform discharges, no electrographic seizures, and none of the patient's typical episodes are captured during this recording session.    Veronica Barajas MD PhD [facility]  Neurology-Epilepsy  [facility].
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
## 2023-06-20 ENCOUNTER — TELEPHONE (OUTPATIENT)
Dept: HEPATOLOGY | Facility: CLINIC | Age: 43
End: 2023-06-20

## 2023-06-20 NOTE — TELEPHONE ENCOUNTER
Referral to hepatology for elevated liver enzymes     Scheduling attempt # 1    Please call pt to schedule appt with JOSE RAMON  Encourage video visits for new patient appts with APPs

## 2023-06-20 NOTE — TELEPHONE ENCOUNTER
Scheduling attempt #2    Patient called to schedule appt.  No answer, [facility] with call back .

## 2023-06-21 NOTE — TELEPHONE ENCOUNTER
Referral to hepatology for elevated liver enzymes      Scheduling attempt # 3  Called the patient to schedule a hepatology consult from referral.  No answer, left voicemail with call back # [phone]. Letter mailed out.